# Patient Record
Sex: FEMALE | Race: ASIAN | URBAN - METROPOLITAN AREA
[De-identification: names, ages, dates, MRNs, and addresses within clinical notes are randomized per-mention and may not be internally consistent; named-entity substitution may affect disease eponyms.]

---

## 2020-12-22 ENCOUNTER — TELEPHONE ENCOUNTER (OUTPATIENT)
Dept: URBAN - METROPOLITAN AREA CLINIC 100 | Facility: CLINIC | Age: 35
End: 2020-12-22

## 2021-03-22 ENCOUNTER — ERX REFILL RESPONSE (OUTPATIENT)
Dept: URBAN - METROPOLITAN AREA CLINIC 92 | Facility: CLINIC | Age: 36
End: 2021-03-22

## 2021-03-22 RX ORDER — MESALAMINE 1.2 G/1
TAKE 4 TABLETS BY MOUTH EVERY DAY TABLET, DELAYED RELEASE ORAL
Qty: 360 | Refills: 0

## 2021-03-30 ENCOUNTER — TELEPHONE ENCOUNTER (OUTPATIENT)
Dept: URBAN - METROPOLITAN AREA CLINIC 23 | Facility: CLINIC | Age: 36
End: 2021-03-30

## 2021-03-30 RX ORDER — MESALAMINE 1.2 G/1
TAKE 4 TABLETS BY MOUTH EVERY DAY TABLET, DELAYED RELEASE ORAL
OUTPATIENT

## 2021-08-01 ENCOUNTER — TELEPHONE ENCOUNTER (OUTPATIENT)
Dept: URBAN - METROPOLITAN AREA CLINIC 92 | Facility: CLINIC | Age: 36
End: 2021-08-01

## 2021-08-01 RX ORDER — MESALAMINE 1.2 G/1
4 CAPSULES TABLET, DELAYED RELEASE ORAL ONCE A DAY
Qty: 360 CAPSULE | Refills: 3

## 2021-08-01 RX ORDER — BUDESONIDE 9 MG/1
TAKE 1 TABLET BY MOUTH ONCE DAILY IN THE MORNING, SWALLOW WHOLE WITH WATER, DONT BREAK OR CRUSH TABLET, EXTENDED RELEASE ORAL ONCE A DAY
Qty: 30 | Refills: 2
Start: 2019-08-16 | End: 2021-10-30

## 2021-08-10 ENCOUNTER — OFFICE VISIT (OUTPATIENT)
Dept: URBAN - METROPOLITAN AREA TELEHEALTH 2 | Facility: TELEHEALTH | Age: 36
End: 2021-08-10
Payer: COMMERCIAL

## 2021-08-10 DIAGNOSIS — K92.1 HEMATOCHEZIA: ICD-10-CM

## 2021-08-10 DIAGNOSIS — K51.80 CHRONIC PANCOLONIC ULCERATIVE COLITIS: ICD-10-CM

## 2021-08-10 DIAGNOSIS — R10.84 ABDOMINAL CRAMPING, GENERALIZED: ICD-10-CM

## 2021-08-10 DIAGNOSIS — R19.7 DIARRHEA: ICD-10-CM

## 2021-08-10 PROCEDURE — 99214 OFFICE O/P EST MOD 30 MIN: CPT | Performed by: INTERNAL MEDICINE

## 2021-08-10 RX ORDER — BUDESONIDE 9 MG/1
TAKE 1 TABLET BY MOUTH ONCE DAILY IN THE MORNING, SWALLOW WHOLE WITH WATER, DONT BREAK OR CRUSH TABLET, EXTENDED RELEASE ORAL ONCE A DAY
Qty: 30 | Refills: 2 | Status: ACTIVE | COMMUNITY
Start: 2019-08-16 | End: 2021-10-30

## 2021-08-10 RX ORDER — MESALAMINE 1.2 G/1
TAKE 4 TABLETS BY MOUTH EVERY DAY TABLET, DELAYED RELEASE ORAL
Status: ACTIVE | COMMUNITY

## 2021-08-10 RX ORDER — MESALAMINE 1.2 G/1
4 CAPSULES TABLET, DELAYED RELEASE ORAL ONCE A DAY
Qty: 360 CAPSULE | Refills: 3

## 2021-08-10 RX ORDER — BUDESONIDE 9 MG/1
TAKE 1 TABLET BY MOUTH ONCE DAILY IN THE MORNING, SWALLOW WHOLE WITH WATER, DONT BREAK OR CRUSH TABLET, EXTENDED RELEASE ORAL ONCE A DAY
Qty: 30 | Refills: 2

## 2021-08-10 NOTE — HPI-TODAY'S VISIT:
wt incr 18# over 2.5y (was 129) - post pregnancy  colonoscopy 2019 WNL to cecum bx's throughout WNL  chronic ulcerative proctitis colitis x 7yrs  recent mucus and BRB in stool, fatigue and chills off of meds  +arthritis - elbows and knees  denies proctalgia diarrhea abd pain or visual changes  called on-call MD and received Mesalamine and Budesonide rx's  flex sig 2016 showed perianal and diffuse inflammation in the distal rectum  5yo daughter Kenzie,  8mo daughter as well  at [place] , lives in [place]  [place] - from [employer] [facility] in [place] at [employer]  ROS/ urticaria

## 2021-11-09 ENCOUNTER — ERX REFILL RESPONSE (OUTPATIENT)
Dept: URBAN - METROPOLITAN AREA CLINIC 92 | Facility: CLINIC | Age: 36
End: 2021-11-09

## 2021-11-09 RX ORDER — BUDESONIDE 9 MG/1
TAKE 1 TABLET BY MOUTH ONCE DAILY IN THE MORNING, SWALLOW WHOLE WITH WATER, DONT BREAK OR CRUSH TABLET, EXTENDED RELEASE ORAL ONCE A DAY
Qty: 30 | Refills: 2 | OUTPATIENT

## 2021-11-09 RX ORDER — BUDESONIDE 9 MG/1
TAKE ONE TABLET BY MOUTH EVERY MORNING . SWALLOW WHOLE WITH WATER, DON'T BREAK OR CRUSH TABLET, FILM COATED, EXTENDED RELEASE ORAL
Qty: 30 | Refills: 1 | OUTPATIENT

## 2022-04-10 ENCOUNTER — ERX REFILL RESPONSE (OUTPATIENT)
Dept: URBAN - METROPOLITAN AREA CLINIC 92 | Facility: CLINIC | Age: 37
End: 2022-04-10

## 2022-04-10 RX ORDER — MESALAMINE 1.2 G/1
TAKE 4 TABLETS BY MOUTH EVERY DAY TABLET, DELAYED RELEASE ORAL
Qty: 360 TABLET | Refills: 1 | OUTPATIENT

## 2022-04-10 RX ORDER — MESALAMINE 1.2 G/1
TAKE 4 TABLETS BY MOUTH EVERY DAY TABLET, DELAYED RELEASE ORAL
Qty: 360 | Refills: 0 | OUTPATIENT

## 2022-04-26 ENCOUNTER — TELEPHONE ENCOUNTER (OUTPATIENT)
Dept: URBAN - METROPOLITAN AREA CLINIC 92 | Facility: CLINIC | Age: 37
End: 2022-04-26

## 2022-04-26 RX ORDER — BUDESONIDE 9 MG/1
TAKE ONE TABLET BY MOUTH EVERY MORNING . SWALLOW WHOLE WITH WATER, DON'T BREAK OR CRUSH TABLET, FILM COATED, EXTENDED RELEASE ORAL
Qty: 30 | Refills: 1

## 2022-06-22 ENCOUNTER — WEB ENCOUNTER (OUTPATIENT)
Dept: URBAN - METROPOLITAN AREA CLINIC 92 | Facility: CLINIC | Age: 37
End: 2022-06-22

## 2022-06-23 ENCOUNTER — WEB ENCOUNTER (OUTPATIENT)
Dept: URBAN - METROPOLITAN AREA CLINIC 92 | Facility: CLINIC | Age: 37
End: 2022-06-23

## 2022-06-29 ENCOUNTER — ERX REFILL RESPONSE (OUTPATIENT)
Dept: URBAN - METROPOLITAN AREA CLINIC 92 | Facility: CLINIC | Age: 37
End: 2022-06-29

## 2022-06-29 PROBLEM — 30731004 GLOSSODYNIA: Status: ACTIVE | Noted: 2022-06-29

## 2022-06-29 RX ORDER — BUDESONIDE 9 MG/1
TAKE ONE TABLET BY MOUTH EVERY MORNING . SWALLOW WHOLE WITH WATER, DON'T BREAK OR CRUSH TABLET, FILM COATED, EXTENDED RELEASE ORAL
Qty: 30 | Refills: 1 | OUTPATIENT

## 2022-06-29 RX ORDER — BUDESONIDE 9 MG/1
TAKE ONE TABLET BY MOUTH EVERY MORNING . SWALLOW WHOLE WITH WATER, DON'T BREAK OR CRUSH TABLET, EXTENDED RELEASE ORAL
Qty: 30 TABLET | Refills: 0 | OUTPATIENT

## 2022-07-01 ENCOUNTER — OFFICE VISIT (OUTPATIENT)
Dept: URBAN - METROPOLITAN AREA TELEHEALTH 2 | Facility: TELEHEALTH | Age: 37
End: 2022-07-01
Payer: COMMERCIAL

## 2022-07-01 VITALS — HEIGHT: 64 IN | BODY MASS INDEX: 23.05 KG/M2 | WEIGHT: 135 LBS

## 2022-07-01 DIAGNOSIS — R10.84 ABDOMINAL CRAMPING, GENERALIZED: ICD-10-CM

## 2022-07-01 DIAGNOSIS — K92.1 HEMATOCHEZIA: ICD-10-CM

## 2022-07-01 DIAGNOSIS — K51.80 CHRONIC PANCOLONIC ULCERATIVE COLITIS: ICD-10-CM

## 2022-07-01 DIAGNOSIS — K14.0 TONGUE INFLAMMATION: ICD-10-CM

## 2022-07-01 DIAGNOSIS — M25.50 ARTHRALGIA: ICD-10-CM

## 2022-07-01 DIAGNOSIS — K14.6 TONGUE BURNING SENSATION: ICD-10-CM

## 2022-07-01 DIAGNOSIS — M53.3 COCCYGALGIA: ICD-10-CM

## 2022-07-01 DIAGNOSIS — R19.7 DIARRHEA: ICD-10-CM

## 2022-07-01 PROCEDURE — 99214 OFFICE O/P EST MOD 30 MIN: CPT | Performed by: INTERNAL MEDICINE

## 2022-07-01 RX ORDER — MESALAMINE 1.2 G/1
TAKE 4 TABLETS BY MOUTH EVERY DAY TABLET, DELAYED RELEASE ORAL
Qty: 360 TABLET | Refills: 1 | Status: ACTIVE | COMMUNITY

## 2022-07-01 RX ORDER — BUDESONIDE 9 MG/1
TAKE ONE TABLET BY MOUTH EVERY MORNING . SWALLOW WHOLE WITH WATER, DON'T BREAK OR CRUSH TABLET, EXTENDED RELEASE ORAL
Qty: 30 TABLET | Refills: 0 | Status: ACTIVE | COMMUNITY

## 2022-07-01 RX ORDER — MESALAMINE 1.2 G/1
4 CAPSULES TABLET, DELAYED RELEASE ORAL ONCE A DAY
Qty: 360 CAPSULE | Refills: 3

## 2022-07-01 RX ORDER — NYSTATIN 100000 [USP'U]/ML
4 ML SUSPENSION ORAL TWICE A DAY
Qty: 240 ML | Refills: 1 | OUTPATIENT
Start: 2022-07-01 | End: 2022-08-30

## 2022-07-01 RX ORDER — BUDESONIDE 9 MG/1
TAKE 1 TABLET BY MOUTH ONCE DAILY IN THE MORNING, SWALLOW WHOLE WITH WATER, DONT BREAK OR CRUSH TABLET, EXTENDED RELEASE ORAL ONCE A DAY
Qty: 30 | Refills: 2

## 2022-07-01 NOTE — HPI-TODAY'S VISIT:
wt incr 6# over 1y  notes tongue swelling and burning improved w budeonside  colonoscopy 5/2019 WNL to cecum bx's throughout WNL  chronic ulcerative proctitis colitis x 9yrs  no GI syx at present  no further arthritisof  elbows and knees  denies BRB proctalgia diarrhea abd pain or visual changes  flex sig feb 2016 showed perianal and diffuse inflammation in the distal rectum  5yo daughter Kenzie, 2yo daughter as well  at [place] , lives in [place]  [place] - from [facility] in [place] at [employer]  ROS/ urticaria

## 2022-07-14 ENCOUNTER — ERX REFILL RESPONSE (OUTPATIENT)
Dept: URBAN - METROPOLITAN AREA CLINIC 92 | Facility: CLINIC | Age: 37
End: 2022-07-14

## 2022-07-14 RX ORDER — MESALAMINE 1.2 G/1
TAKE 4 TABLETS BY MOUTH EVERY DAY TABLET, DELAYED RELEASE ORAL
Qty: 360 TABLET | Refills: 0 | OUTPATIENT

## 2022-07-14 RX ORDER — MESALAMINE 1.2 G/1
TAKE 4 TABLETS BY MOUTH EVERY DAY TABLET, DELAYED RELEASE ORAL
Qty: 360 TABLET | Refills: 1 | OUTPATIENT

## 2022-08-01 ENCOUNTER — ERX REFILL RESPONSE (OUTPATIENT)
Dept: URBAN - METROPOLITAN AREA CLINIC 92 | Facility: CLINIC | Age: 37
End: 2022-08-01

## 2022-08-01 RX ORDER — BUDESONIDE 9 MG/1
TAKE ONE TABLET BY MOUTH EVERY MORNING . SWALLOW WHOLE WITH WATER, DON'T BREAK OR CRUSH TABLET, EXTENDED RELEASE ORAL
Qty: 30 TABLET | Refills: 0 | OUTPATIENT

## 2022-10-06 ENCOUNTER — ERX REFILL RESPONSE (OUTPATIENT)
Dept: URBAN - METROPOLITAN AREA CLINIC 92 | Facility: CLINIC | Age: 37
End: 2022-10-06

## 2022-10-06 RX ORDER — MESALAMINE 1.2 G/1
TAKE 4 TABLETS BY MOUTH EVERY DAY TABLET, DELAYED RELEASE ORAL
Qty: 360 TABLET | Refills: 0 | OUTPATIENT

## 2023-01-12 ENCOUNTER — ERX REFILL RESPONSE (OUTPATIENT)
Dept: URBAN - METROPOLITAN AREA CLINIC 92 | Facility: CLINIC | Age: 38
End: 2023-01-12

## 2023-01-12 RX ORDER — MESALAMINE 1.2 G/1
TAKE 4 TABLETS BY MOUTH EVERY DAY TABLET, DELAYED RELEASE ORAL
Qty: 360 TABLET | Refills: 0 | OUTPATIENT

## 2023-04-17 ENCOUNTER — TELEPHONE ENCOUNTER (OUTPATIENT)
Dept: URBAN - METROPOLITAN AREA CLINIC 92 | Facility: CLINIC | Age: 38
End: 2023-04-17

## 2023-04-17 RX ORDER — MESALAMINE 1.2 G/1
TAKE 4 TABLETS BY MOUTH EVERY DAY TABLET, DELAYED RELEASE ORAL
Qty: 360 TABLET | Refills: 5

## 2023-04-18 ENCOUNTER — WEB ENCOUNTER (OUTPATIENT)
Dept: URBAN - METROPOLITAN AREA CLINIC 92 | Facility: CLINIC | Age: 38
End: 2023-04-18

## 2023-04-18 RX ORDER — MESALAMINE 1.2 G/1
TAKE 4 TABLETS BY MOUTH EVERY DAY TABLET, DELAYED RELEASE ORAL
Qty: 360 TABLET | Refills: 5
End: 2024-10-09

## 2023-04-18 RX ORDER — BUDESONIDE 9 MG/1
TAKE ONE TABLET BY MOUTH EVERY MORNING . SWALLOW WHOLE WITH WATER, DON'T BREAK OR CRUSH TABLET, EXTENDED RELEASE ORAL
Qty: 30 TABLET | Refills: 0
End: 2023-07-23

## 2023-04-18 RX ORDER — MESALAMINE 1.2 G/1
TAKE 4 TABLETS BY MOUTH EVERY DAY TABLET, DELAYED RELEASE ORAL
Qty: 360 TABLET | Refills: 0
End: 2023-07-23

## 2023-04-18 RX ORDER — HYDROCORTISONE 100 MG/60ML
1 DOSE ENEMA RECTAL
Qty: 90 | Refills: 3
End: 2023-07-23

## 2023-05-22 ENCOUNTER — OFFICE VISIT (OUTPATIENT)
Dept: URBAN - METROPOLITAN AREA TELEHEALTH 2 | Facility: TELEHEALTH | Age: 38
End: 2023-05-22
Payer: COMMERCIAL

## 2023-05-22 VITALS — BODY MASS INDEX: 23.05 KG/M2 | WEIGHT: 135 LBS | HEIGHT: 64 IN

## 2023-05-22 DIAGNOSIS — K51.90 ULCERATIVE COLITIS: ICD-10-CM

## 2023-05-22 DIAGNOSIS — M53.3 COCCYGALGIA: ICD-10-CM

## 2023-05-22 DIAGNOSIS — K14.6 TONGUE BURNING SENSATION: ICD-10-CM

## 2023-05-22 DIAGNOSIS — K92.1 HEMATOCHEZIA: ICD-10-CM

## 2023-05-22 DIAGNOSIS — R10.9 ABDOMINAL PAIN: ICD-10-CM

## 2023-05-22 DIAGNOSIS — R19.7 DIARRHEA: ICD-10-CM

## 2023-05-22 DIAGNOSIS — M25.50 ARTHRALGIA: ICD-10-CM

## 2023-05-22 DIAGNOSIS — M54.5 LOW BACK PAIN: ICD-10-CM

## 2023-05-22 DIAGNOSIS — K14.0 TONGUE INFLAMMATION: ICD-10-CM

## 2023-05-22 PROCEDURE — 99214 OFFICE O/P EST MOD 30 MIN: CPT | Performed by: INTERNAL MEDICINE

## 2023-05-22 RX ORDER — HYDROCORTISONE 100 MG/60ML
1 DOSE ENEMA RECTAL
Qty: 90 | Refills: 3 | Status: ACTIVE | COMMUNITY
End: 2023-07-23

## 2023-05-22 RX ORDER — MESALAMINE 1.2 G/1
4 CAPSULES TABLET, DELAYED RELEASE ORAL ONCE A DAY
Qty: 360 CAPSULE | Refills: 3

## 2023-05-22 RX ORDER — BUDESONIDE 9 MG/1
TAKE 1 TABLET BY MOUTH ONCE DAILY IN THE MORNING, SWALLOW WHOLE WITH WATER, DONT BREAK OR CRUSH TABLET, EXTENDED RELEASE ORAL ONCE A DAY
Qty: 30 | Refills: 2

## 2023-05-22 RX ORDER — MESALAMINE 1.2 G/1
TAKE 4 TABLETS BY MOUTH EVERY DAY TABLET, DELAYED RELEASE ORAL
Qty: 360 TABLET | Refills: 0 | Status: ACTIVE | COMMUNITY
End: 2023-07-23

## 2023-05-22 RX ORDER — BUDESONIDE 9 MG/1
TAKE ONE TABLET BY MOUTH EVERY MORNING . SWALLOW WHOLE WITH WATER, DON'T BREAK OR CRUSH TABLET, EXTENDED RELEASE ORAL
Qty: 30 TABLET | Refills: 0 | Status: ACTIVE | COMMUNITY
End: 2023-07-23

## 2023-05-22 NOTE — HPI-TODAY'S VISIT:
wt stable# over 10mo (was 135)  prior tongue swelling and burning improved w budeonside  colonoscopy 5/2019 WNL to cecum bx's throughout WNL  chronic ulcerative proctitis colitis x 9yrs  no GI syx at present  no further arthritis of  elbows and knees  denies BRB proctalgia diarrhea abd pain or visual changes  flex sig feb 2016 showed perianal and diffuse inflammation in the distal rectum  5.4yo daughter Kenzie, 2.4yo daughter as well  at [employer] , lives in [place]  [place] - from [facility] in [place] at [id number]  ROS/ urticaria

## 2023-05-23 ENCOUNTER — WEB ENCOUNTER (OUTPATIENT)
Dept: URBAN - METROPOLITAN AREA CLINIC 92 | Facility: CLINIC | Age: 38
End: 2023-05-23

## 2023-05-30 ENCOUNTER — OFFICE VISIT (OUTPATIENT)
Dept: URBAN - METROPOLITAN AREA SURGERY CENTER 16 | Facility: SURGERY CENTER | Age: 38
End: 2023-05-30

## 2023-10-17 ENCOUNTER — WEB ENCOUNTER (OUTPATIENT)
Dept: URBAN - METROPOLITAN AREA CLINIC 92 | Facility: CLINIC | Age: 38
End: 2023-10-17

## 2023-10-17 RX ORDER — MESALAMINE 400 MG/1
1 TABLET CAPSULE, DELAYED RELEASE ORAL
Qty: 360 TABLET | Refills: 0 | OUTPATIENT
Start: 2023-10-17 | End: 2024-01-14

## 2023-10-20 ENCOUNTER — TELEPHONE ENCOUNTER (OUTPATIENT)
Dept: URBAN - METROPOLITAN AREA CLINIC 92 | Facility: CLINIC | Age: 38
End: 2023-10-20

## 2023-10-20 RX ORDER — MESALAMINE 1.2 G/1
4 CAPSULES TABLET, DELAYED RELEASE ORAL ONCE A DAY
Qty: 360 CAPSULE | Refills: 3
End: 2024-10-14

## 2024-05-10 ENCOUNTER — DASHBOARD ENCOUNTERS (OUTPATIENT)
Age: 39
End: 2024-05-10

## 2024-05-14 ENCOUNTER — OFFICE VISIT (OUTPATIENT)
Dept: URBAN - METROPOLITAN AREA CLINIC 92 | Facility: CLINIC | Age: 39
End: 2024-05-14
Payer: COMMERCIAL

## 2024-05-14 VITALS
HEART RATE: 63 BPM | BODY MASS INDEX: 23.08 KG/M2 | TEMPERATURE: 97 F | DIASTOLIC BLOOD PRESSURE: 79 MMHG | WEIGHT: 135.2 LBS | HEIGHT: 64 IN | SYSTOLIC BLOOD PRESSURE: 109 MMHG

## 2024-05-14 DIAGNOSIS — M25.50 ARTHRALGIA: ICD-10-CM

## 2024-05-14 DIAGNOSIS — K51.20 ULCERATIVE PROCTITIS WITHOUT COMPLICATION: ICD-10-CM

## 2024-05-14 PROBLEM — 10811000202109: Status: ACTIVE | Noted: 2024-05-14

## 2024-05-14 PROCEDURE — 99214 OFFICE O/P EST MOD 30 MIN: CPT | Performed by: PHYSICIAN ASSISTANT

## 2024-05-14 RX ORDER — MESALAMINE 1.2 G/1
4 CAPSULES TABLET, DELAYED RELEASE ORAL ONCE A DAY
Qty: 360 CAPSULE | Refills: 3 | Status: ACTIVE | COMMUNITY
End: 2024-10-14

## 2024-05-14 RX ORDER — BUDESONIDE 9 MG/1
TAKE 1 TABLET BY MOUTH ONCE DAILY IN THE MORNING, SWALLOW WHOLE WITH WATER, DONT BREAK OR CRUSH TABLET, EXTENDED RELEASE ORAL ONCE A DAY
Qty: 30 | Refills: 2 | Status: ON HOLD | COMMUNITY

## 2024-05-14 RX ORDER — MESALAMINE 1.2 G/1
4 CAPSULES TABLET, DELAYED RELEASE ORAL ONCE A DAY
Qty: 360 CAPSULE | Refills: 3
End: 2025-05-09

## 2024-06-25 ENCOUNTER — WEB ENCOUNTER (OUTPATIENT)
Dept: URBAN - METROPOLITAN AREA CLINIC 92 | Facility: CLINIC | Age: 39
End: 2024-06-25

## 2024-06-25 RX ORDER — MESALAMINE 1.2 G/1
4 CAPSULES TABLET, DELAYED RELEASE ORAL ONCE A DAY
Qty: 360 CAPSULE | Refills: 3
End: 2025-06-21

## 2024-08-13 ENCOUNTER — OFFICE VISIT (OUTPATIENT)
Dept: URBAN - METROPOLITAN AREA SURGERY CENTER 16 | Facility: SURGERY CENTER | Age: 39
End: 2024-08-13

## 2024-09-10 ENCOUNTER — OFFICE VISIT (OUTPATIENT)
Dept: URBAN - METROPOLITAN AREA CLINIC 92 | Facility: CLINIC | Age: 39
End: 2024-09-10

## 2024-10-22 ENCOUNTER — TELEPHONE ENCOUNTER (OUTPATIENT)
Dept: URBAN - METROPOLITAN AREA CLINIC 96 | Facility: CLINIC | Age: 39
End: 2024-10-22

## 2024-10-22 ENCOUNTER — CLAIMS CREATED FROM THE CLAIM WINDOW (OUTPATIENT)
Dept: URBAN - METROPOLITAN AREA SURGERY CENTER 16 | Facility: SURGERY CENTER | Age: 39
End: 2024-10-22
Payer: COMMERCIAL

## 2024-10-22 DIAGNOSIS — Z86.0100 PERSONAL HISTORY OF COLONIC POLYPS: ICD-10-CM

## 2024-10-22 DIAGNOSIS — K64.8 INTERNAL HEMORRHOIDS: ICD-10-CM

## 2024-10-22 DIAGNOSIS — K51.90 ULCERATIVE COLITIS WITHOUT COMPLICATIONS, UNSPECIFIED LOCATION: ICD-10-CM

## 2024-10-22 DIAGNOSIS — K51.20 ACUTE ULCERATIVE PROCTITIS: ICD-10-CM

## 2024-10-22 DIAGNOSIS — K52.9 IBD (INFLAMMATORY BOWEL DISEASE): ICD-10-CM

## 2024-10-22 PROCEDURE — 45380 COLONOSCOPY AND BIOPSY: CPT | Performed by: INTERNAL MEDICINE

## 2024-10-22 PROCEDURE — 00811 ANES LWR INTST NDSC NOS: CPT | Performed by: ANESTHESIOLOGY

## 2024-10-22 PROCEDURE — 00811 ANES LWR INTST NDSC NOS: CPT | Performed by: ANESTHESIOLOGIST ASSISTANT

## 2024-10-22 RX ORDER — MESALAMINE 1.2 G/1
4 CAPSULES TABLET, DELAYED RELEASE ORAL ONCE A DAY
Qty: 360 CAPSULE | Refills: 3 | Status: ACTIVE | COMMUNITY
End: 2025-06-21

## 2024-10-22 RX ORDER — BUDESONIDE 9 MG/1
TAKE 1 TABLET BY MOUTH ONCE DAILY IN THE MORNING, SWALLOW WHOLE WITH WATER, DONT BREAK OR CRUSH TABLET, EXTENDED RELEASE ORAL ONCE A DAY
Qty: 30 | Refills: 2 | Status: ON HOLD | COMMUNITY

## 2025-04-16 ENCOUNTER — TELEPHONE ENCOUNTER (OUTPATIENT)
Dept: URBAN - METROPOLITAN AREA CLINIC 94 | Facility: CLINIC | Age: 40
End: 2025-04-16

## 2025-04-24 ENCOUNTER — OFFICE VISIT (OUTPATIENT)
Dept: URBAN - METROPOLITAN AREA TELEHEALTH 2 | Facility: TELEHEALTH | Age: 40
End: 2025-04-24
Payer: COMMERCIAL

## 2025-04-24 DIAGNOSIS — R19.7 DIARRHEA IN ADULT PATIENT: ICD-10-CM

## 2025-04-24 DIAGNOSIS — K51.211 ULCERATIVE PROCTITIS WITH RECTAL BLEEDING: ICD-10-CM

## 2025-04-24 PROCEDURE — 99214 OFFICE O/P EST MOD 30 MIN: CPT | Performed by: INTERNAL MEDICINE

## 2025-04-24 RX ORDER — MESALAMINE 1.2 G/1
4 CAPSULES TABLET, DELAYED RELEASE ORAL ONCE A DAY
Qty: 360 CAPSULE | Refills: 3 | Status: ACTIVE | COMMUNITY
End: 2025-06-21

## 2025-04-24 RX ORDER — MESALAMINE 1000 MG/1
1 SUPPOSITORY AT BEDTIME SUPPOSITORY RECTAL ONCE A DAY
Qty: 90 | Refills: 4 | OUTPATIENT
Start: 2025-04-24

## 2025-04-24 RX ORDER — MESALAMINE 1000 MG/1
1 SUPPOSITORY AT BEDTIME SUPPOSITORY RECTAL ONCE A DAY
Qty: 30 | Status: ACTIVE | COMMUNITY
Start: 2025-04-24

## 2025-04-24 RX ORDER — BUDESONIDE 3 MG/1
3 CAPSULES CAPSULE, DELAYED RELEASE PELLETS ORAL ONCE A DAY
Qty: 90 CAPSULE | Refills: 11 | OUTPATIENT
Start: 2025-04-24

## 2025-04-24 RX ORDER — SUMATRIPTAN 100 MG/1
TABLET, FILM COATED ORAL
Qty: 9 TABLET | Status: ACTIVE | COMMUNITY

## 2025-04-24 RX ORDER — BALSALAZIDE DISODIUM 750 MG/1
6 CAPSULES CAPSULE ORAL ONCE DAILY
Qty: 180 CAPSULE | Refills: 11 | OUTPATIENT
Start: 2025-04-24

## 2025-04-24 RX ORDER — BUDESONIDE 9 MG/1
TAKE 1 TABLET BY MOUTH ONCE DAILY IN THE MORNING, SWALLOW WHOLE WITH WATER, DONT BREAK OR CRUSH TABLET, EXTENDED RELEASE ORAL ONCE A DAY
Qty: 30 | Refills: 2 | Status: ACTIVE | COMMUNITY

## 2025-04-24 NOTE — HPI-TODAY'S VISIT:
40 y/o indiv with h/o UC proctitis, here for eval of her condition. . Pt is referred by Dr. Almeida. . Pt was dxd with UC in 2015 with [place], dxd by Dr. Castillo.  Started on canasa suppository.  Transitioned to meslamine 4 tab daily. . Today on 4/24/2025, overall doing well, but has flare every now and then.  Bleeding started last several months, scant but almost daily, with some mucus.  Also having some left sided abd pain as well. . No FH IBD or GI malignancy . PCP is Angel Coleman and reviewed labs from 12/2023-CBC with diff, CMP WNL  SH: Two daughters;  at [place] . Colonoscopy 5/2019 WNL to cecum, bx's throughout WNL . 10/2024: Impression: Preparation of the colon was fair. Some puddles of liquid and debris. Extra time taken to flush and suction out. Small non-bleeding internal hemorrhoids found on perianal exam. Friability, "sandpaper like" erythema, congestion in the rectum suggestive of mild to moderate proctitis. Biopsied. The colon was a bit tortuous and redundant but the examination was otherwise normal in the colon on direct and retroflexion views. Biopsies were taken with a cold forceps in the entire normal appearing colon (cecum/right colon/transverse colon and left colon for histology. Estimated blood loss was minimal. The examined portion of the ileum was normal. Biopsied. Final Pathologic Diagnosis A Terminal ileum, Biopsy Ileal mucosa with no significant histopathology. No histologic evidence of active or chronic ileitis. No granulomata seen. B Cecum, Biopsy Colonic mucosa with no significant histopathology. No histologic evidence of active, chronic or microscopic colitis. No granulomata or dysplasia seen. C Colon, Right, Biopsy Colonic mucosa with no significant histopathology. No histologic evidence of active, chronic or microscopic colitis. No granulomata or dysplasia seen. D Colon, Transverse, Biopsy Colonic mucosa with no significant histopathology. No histologic evidence of active, chronic or microscopic colitis. No granulomata or dysplasia seen. E Colon, Left, Biopsy Colonic mucosa with no significant histopathology. No histologic evidence of active, chronic or microscopic colitis. No granulomata or dysplasia seen. F Rectum, Biopsy Chronic active proctitis. Compatible with clinical history of ulcerative colitis. No granulomata or dysplasia seen.

## 2025-08-25 ENCOUNTER — OFFICE VISIT (OUTPATIENT)
Dept: URBAN - METROPOLITAN AREA TELEHEALTH 2 | Facility: TELEHEALTH | Age: 40
End: 2025-08-25
Payer: COMMERCIAL

## 2025-08-25 DIAGNOSIS — R19.7 DIARRHEA IN ADULT PATIENT: ICD-10-CM

## 2025-08-25 DIAGNOSIS — K51.211 ULCERATIVE PROCTITIS WITH RECTAL BLEEDING: ICD-10-CM

## 2025-08-25 PROCEDURE — 99214 OFFICE O/P EST MOD 30 MIN: CPT | Performed by: INTERNAL MEDICINE

## 2025-08-25 RX ORDER — SUMATRIPTAN 100 MG/1
TABLET, FILM COATED ORAL
Qty: 9 TABLET | Status: ACTIVE | COMMUNITY

## 2025-08-25 RX ORDER — BALSALAZIDE DISODIUM 750 MG/1
6 CAPSULES CAPSULE ORAL ONCE DAILY
Qty: 180 CAPSULE | Refills: 11 | Status: ACTIVE | COMMUNITY
Start: 2025-04-24

## 2025-08-25 RX ORDER — BUDESONIDE 3 MG/1
3 CAPSULES CAPSULE, DELAYED RELEASE PELLETS ORAL ONCE A DAY
Qty: 90 CAPSULE | Refills: 11 | OUTPATIENT
Start: 2025-08-25

## 2025-08-25 RX ORDER — BUDESONIDE 3 MG/1
3 CAPSULES CAPSULE, DELAYED RELEASE PELLETS ORAL ONCE A DAY
Qty: 90 CAPSULE | Refills: 11 | Status: ACTIVE | COMMUNITY
Start: 2025-04-24

## 2025-08-25 RX ORDER — BUDESONIDE 9 MG/1
TAKE 1 TABLET BY MOUTH ONCE DAILY IN THE MORNING, SWALLOW WHOLE WITH WATER, DONT BREAK OR CRUSH TABLET, EXTENDED RELEASE ORAL ONCE A DAY
Qty: 30 | Refills: 2 | Status: ACTIVE | COMMUNITY

## 2025-08-25 RX ORDER — MESALAMINE 1000 MG/1
1 SUPPOSITORY AT BEDTIME SUPPOSITORY RECTAL ONCE A DAY
Qty: 30 | Status: ON HOLD | COMMUNITY
Start: 2025-04-24

## 2025-08-25 RX ORDER — MESALAMINE 1000 MG/1
1 SUPPOSITORY AT BEDTIME SUPPOSITORY RECTAL ONCE A DAY
Qty: 90 | Refills: 4 | OUTPATIENT
Start: 2025-08-25

## 2025-08-25 RX ORDER — BALSALAZIDE DISODIUM 750 MG/1
6 CAPSULES CAPSULE ORAL ONCE DAILY
Qty: 180 CAPSULE | Refills: 11 | OUTPATIENT
Start: 2025-08-25

## 2025-08-25 RX ORDER — MESALAMINE 1000 MG/1
1 SUPPOSITORY AT BEDTIME SUPPOSITORY RECTAL ONCE A DAY
Qty: 90 | Refills: 4 | Status: ACTIVE | COMMUNITY
Start: 2025-04-24